# Patient Record
Sex: MALE | Race: WHITE | Employment: UNEMPLOYED | URBAN - METROPOLITAN AREA
[De-identification: names, ages, dates, MRNs, and addresses within clinical notes are randomized per-mention and may not be internally consistent; named-entity substitution may affect disease eponyms.]

---

## 2024-07-29 ENCOUNTER — HOSPITAL ENCOUNTER (EMERGENCY)
Age: 64
Discharge: HOME | End: 2024-07-29
Attending: EMERGENCY MEDICINE
Payer: MEDICARE

## 2024-07-29 VITALS
BODY MASS INDEX: 32.58 KG/M2 | WEIGHT: 215 LBS | TEMPERATURE: 97.3 F | HEIGHT: 68 IN | OXYGEN SATURATION: 97 % | HEART RATE: 93 BPM | SYSTOLIC BLOOD PRESSURE: 126 MMHG | DIASTOLIC BLOOD PRESSURE: 62 MMHG | RESPIRATION RATE: 18 BRPM

## 2024-07-29 DIAGNOSIS — Z98.1 HISTORY OF LUMBAR SPINAL FUSION: ICD-10-CM

## 2024-07-29 DIAGNOSIS — M54.50 ACUTE MIDLINE LOW BACK PAIN WITHOUT SCIATICA: Primary | ICD-10-CM

## 2024-07-29 PROCEDURE — 99282 EMERGENCY DEPT VISIT SF MDM: CPT

## 2024-07-29 RX ORDER — OXYCODONE HYDROCHLORIDE 5 MG/1
5 TABLET ORAL EVERY 4 HOURS PRN
COMMUNITY
Start: 2024-07-24

## 2024-07-29 RX ORDER — PANTOPRAZOLE SODIUM 40 MG/1
40 TABLET, DELAYED RELEASE ORAL DAILY
COMMUNITY
Start: 2024-01-12

## 2024-07-29 RX ORDER — AMLODIPINE BESYLATE 5 MG/1
5 TABLET ORAL DAILY
COMMUNITY
Start: 2024-06-25

## 2024-07-29 RX ORDER — DIAZEPAM 5 MG/1
5 TABLET ORAL EVERY 8 HOURS PRN
COMMUNITY

## 2024-07-29 ASSESSMENT — LIFESTYLE VARIABLES
HOW OFTEN DO YOU HAVE A DRINK CONTAINING ALCOHOL: NEVER
HOW MANY STANDARD DRINKS CONTAINING ALCOHOL DO YOU HAVE ON A TYPICAL DAY: PATIENT DOES NOT DRINK

## 2024-07-29 ASSESSMENT — PAIN DESCRIPTION - LOCATION: LOCATION: BACK

## 2024-07-29 ASSESSMENT — PAIN DESCRIPTION - DESCRIPTORS: DESCRIPTORS: ACHING

## 2024-07-29 ASSESSMENT — PAIN SCALES - GENERAL: PAINLEVEL_OUTOF10: 10

## 2024-07-29 ASSESSMENT — PAIN - FUNCTIONAL ASSESSMENT: PAIN_FUNCTIONAL_ASSESSMENT: 0-10

## 2024-07-29 NOTE — ED PROVIDER NOTES
[facility] EMERGENCY DEPARTMENT  EMERGENCY DEPARTMENT ENCOUNTER        Pt Name: Jacky Jacob  MRN: [id number]  Birthdate 1960  Date of evaluation: 7/29/2024  Provider: Chrystal Velazquez MD  PCP: No primary care provider on file.  Note Started: 6:20 AM EDT 7/29/24    CHIEF COMPLAINT      pain  HISTORY OF PRESENT ILLNESS: 1 or more Elements     Chief Complaint   Patient presents with    Back Pain     Patient arrives through triage with complaints of lower back pain. Patient reports having 3 vertebrae fused last week and the pain has been unbearable. Denies any fever, n/v. Denies any weakness, numbness, tingling in LE's.      History from : Patient  Limitations to history : None    Jacky Jacob is a 64 y.o. male who presents to the emergency department secondary to concern for pain.  He reports that he had surgery a couple of weeks ago (at [facility] with Dr. Melendez, lumbar fusion L3-4-5).  He states that he for the last few days has been unable to sleep because of the pain.  He states the Percocet is not working and he is chewing a lot like candy.  He denies any falls or trauma.  He has no new numbness tingling or weakness.  He denies any fevers chills vomiting or diarrhea.  He states that he has not been eating well but is drinking water with all of his medications.  No known sick contacts.  He states he was at the doctor's for his 2-week visit recently where they did x-ray imaging and told him everything looked great.    Past medical history noted below.  History of smoking, wife still smokes.  Aside from what is stated above denies any other symptoms or modifying factors.    Nursing Notes were all reviewed and agreed with or any disagreements addressed in HPI/MDM.  REVIEW OF SYSTEMS :    Review of Systems Pertinent positive and negative findings as documented in the HPI  PAST MEDICAL HISTORY      Past Medical History:   Diagnosis Date    Arrhythmia 2013    cardioverted

## 2024-07-29 NOTE — DISCHARGE INSTRUCTIONS
You were offered here labs, imaging, and evaluation for concern of acute issue with your back. You declined the workup. We recommend you call your surgeon Dr. Melendez at [facility] since you had back surgery on 7/9/2024. We discussed it was not in the practice of emergency departments to give out more pain medication. Particularly if the narcotic pain medication you have currently is not working for you.     Return to ED for further evaluation and management for any new or worsening symptoms or concerns.